# Patient Record
Sex: MALE | Race: WHITE
[De-identification: names, ages, dates, MRNs, and addresses within clinical notes are randomized per-mention and may not be internally consistent; named-entity substitution may affect disease eponyms.]

---

## 2019-11-06 ENCOUNTER — HOSPITAL ENCOUNTER (EMERGENCY)
Dept: HOSPITAL 97 - ER | Age: 64
Discharge: ANOTHER HOSPITAL | End: 2019-11-06
Payer: COMMERCIAL

## 2019-11-06 VITALS — DIASTOLIC BLOOD PRESSURE: 78 MMHG | SYSTOLIC BLOOD PRESSURE: 151 MMHG

## 2019-11-06 VITALS — TEMPERATURE: 98.1 F

## 2019-11-06 VITALS — OXYGEN SATURATION: 98 %

## 2019-11-06 DIAGNOSIS — F17.210: ICD-10-CM

## 2019-11-06 DIAGNOSIS — E11.9: ICD-10-CM

## 2019-11-06 DIAGNOSIS — I21.3: Primary | ICD-10-CM

## 2019-11-06 DIAGNOSIS — E78.00: ICD-10-CM

## 2019-11-06 DIAGNOSIS — I10: ICD-10-CM

## 2019-11-06 DIAGNOSIS — Z95.818: ICD-10-CM

## 2019-11-06 LAB
ALBUMIN SERPL BCP-MCNC: 3.7 G/DL (ref 3.4–5)
ALP SERPL-CCNC: 68 U/L (ref 45–117)
ALT SERPL W P-5'-P-CCNC: 19 U/L (ref 12–78)
AST SERPL W P-5'-P-CCNC: 17 U/L (ref 15–37)
BUN BLD-MCNC: 16 MG/DL (ref 7–18)
GLUCOSE SERPLBLD-MCNC: 175 MG/DL (ref 74–106)
HCT VFR BLD CALC: 45.6 % (ref 39.6–49)
INR BLD: 0.92
LYMPHOCYTES # SPEC AUTO: 2.1 K/UL (ref 0.7–4.9)
MAGNESIUM SERPL-MCNC: 1.9 MG/DL (ref 1.8–2.4)
NT-PROBNP SERPL-MCNC: 722 PG/ML (ref ?–125)
PMV BLD: 9.8 FL (ref 7.6–11.3)
POTASSIUM SERPL-SCNC: 3.7 MMOL/L (ref 3.5–5.1)
RBC # BLD: 4.5 M/UL (ref 4.33–5.43)
TROPONIN (EMERG DEPT USE ONLY): 0.21 NG/ML (ref 0–0.04)

## 2019-11-06 PROCEDURE — 83880 ASSAY OF NATRIURETIC PEPTIDE: CPT

## 2019-11-06 PROCEDURE — 96365 THER/PROPH/DIAG IV INF INIT: CPT

## 2019-11-06 PROCEDURE — 71045 X-RAY EXAM CHEST 1 VIEW: CPT

## 2019-11-06 PROCEDURE — 80076 HEPATIC FUNCTION PANEL: CPT

## 2019-11-06 PROCEDURE — 84484 ASSAY OF TROPONIN QUANT: CPT

## 2019-11-06 PROCEDURE — 80048 BASIC METABOLIC PNL TOTAL CA: CPT

## 2019-11-06 PROCEDURE — 83735 ASSAY OF MAGNESIUM: CPT

## 2019-11-06 PROCEDURE — 96375 TX/PRO/DX INJ NEW DRUG ADDON: CPT

## 2019-11-06 PROCEDURE — 85025 COMPLETE CBC W/AUTO DIFF WBC: CPT

## 2019-11-06 PROCEDURE — 85610 PROTHROMBIN TIME: CPT

## 2019-11-06 PROCEDURE — 99291 CRITICAL CARE FIRST HOUR: CPT

## 2019-11-06 PROCEDURE — 36415 COLL VENOUS BLD VENIPUNCTURE: CPT

## 2019-11-06 PROCEDURE — 92977: CPT

## 2019-11-06 PROCEDURE — 93005 ELECTROCARDIOGRAM TRACING: CPT

## 2019-11-06 NOTE — EDPHYS
Physician Documentation                                                                           

 [facility]                                                                 

Name: Roosevelt Antonio                                                                                

Age: 64 yrs                                                                                       

Sex: Male                                                                                         

: 1955                                                                                   

MRN: [id number]                                                                                   

Arrival Date: 2019                                                                          

Time: 03:06                                                                                       

Account#: [id number]                                                                            

Bed 14                                                                                            

Private MD:                                                                                       

ED Physician Dylan Pro                                                                         

HPI:                                                                                              

                                                                                             

03:22 This 64 yrs old  Male presents to ER via Wheelchair with complaints of Chest   rn  

      Pain.                                                                                       

03:22 The patient or guardian reports chest pain that is located primarily in the substernal  rn  

      area. Onset: 2 hour(s) ago. The pain does not radiate. Associated signs and symptoms:       

      Pertinent negatives: abdominal pain, headache, near syncope, palpitations, syncope,         

      vomiting. The chest pain is described as a heaviness, squeezing. Duration: The patient      

      or guardian reports a single episode, that is still ongoing. Modifying factors: The         

      symptoms are alleviated by nothing. the symptoms are aggravated by nothing. Severity of     

      pain: At its worst the pain was moderate in the emergency department the pain is            

      actually worse. The patient has not experienced similar symptoms in the past. The           

      patient has not recently seen a physician. Reports is from out of town, woke up from        

      sleep with chest pain, feels like someone standing on chest, no radiation, no trauma,       

      no fever/cough. Has had carotid stents in past, does not take his medication, has           

      diabetes/HTN/HLD. Reports intermittent lighter episodes of chest pain in last few weeks     

      leading up to this. Took antacid meds prior to coming and did not help. .                   

                                                                                                  

Historical:                                                                                       

- Allergies:                                                                                      

03:11 No Known Allergies;                                                                     jb4 

- Home Meds:                                                                                      

03:11 blood pressure medication [Active]; cholesterol medication [Active];                    jb4 

- PMHx:                                                                                           

03:11 border line diabetic; Hypertension; High Cholesterol;                                   jb4 

- PSHx:                                                                                           

03:11 carotid stents x2;                                                                      jb4 

                                                                                                  

- Immunization history:: Adult Immunizations up to date.                                          

- Social history:: Smoking status: Patient uses tobacco products, smokes one pack                 

  cigarettes per day. Patient uses alcohol, occasionally.                                         

- Ebola Screening: : No symptoms or risks identified at this time.                                

- Family history:: not pertinent.                                                                 

- Hospitalizations: : No recent hospitalization is reported.                                      

                                                                                                  

                                                                                                  

ROS:                                                                                              

03:24 Constitutional: Negative for fever, chills, and weight loss, Eyes: Negative for injury, rn  

      pain, redness, and discharge, Neck: Negative for injury, pain, and swelling,                

      Cardiovascular: Negative for palpitations, and edema, Respiratory: Negative for             

      shortness of breath, cough, wheezing, and pleuritic chest pain, Abdomen/GI: Negative        

      for abdominal pain, diarrhea, and constipation, MS/Extremity: Negative for injury and       

      deformity, Skin: Negative for injury, rash, and discoloration, Neuro: Negative for          

      headache, weakness, numbness, tingling, and seizure.                                        

                                                                                                  

Exam:                                                                                             

03:24 Constitutional:  This is a well developed, well nourished patient who is awake, alert,  rn  

      appears uncomfortable and anxious Head/Face:  Normocephalic, atraumatic. Eyes:  Pupils      

      equal round and reactive to light, extra-ocular motions intact.  Lids and lashes            

      normal.  Conjunctiva and sclera are non-icteric and not injected.  Cornea within normal     

      limits.  Periorbital areas with no swelling, redness, or edema. ENT:  MMM                   

      Cardiovascular:  Regular rate and rhythm.  No pulse deficits. Respiratory:  Lungs have      

      equal breath sounds bilaterally, clear to auscultation.  No increased work of               

      breathing, no retractions or nasal flaring. Abdomen/GI:  Soft, non-tender MS/               

      Extremity:  Pulses equal, no cyanosis.  Neurovascular intact.  Full, normal range of        

      motion.  Equal circumference. Neuro:  Awake and alert, GCS 15, oriented to person,          

      place, time, and situation.  Cranial nerves II-XII grossly intact.  Motor strength 5/5      

      in all extremities.  Sensory grossly intact.  Cerebellar exam normal.  Normal gait.         

04:09 ECG was reviewed by the Attending Physician.                                            rn  

                                                                                                  

Vital Signs:                                                                                      

03:11  / 93; Pulse 99; Resp 16; Temp 98.1(O); Pulse Ox 99% on R/A; Weight 72.57 kg (R); jb4 

      Height 5 ft. 7 in. (170.18 cm) (R); Pain 7/10;                                              

03:23 Weight 68.45 kg;                                                                        wh  

03:30  / 79; Pulse 98; Resp 96; Pulse Ox 98% on 2 lpm NC;                               wh  

04:00  / 78; Pulse 96; Resp 18; Pulse Ox 98% on R/A;                                    wh  

03:23 Body Mass Index 23.63 (68.45 kg, 170.18 cm)                                             wh  

                                                                                                  

MDM:                                                                                              

03:07 Patient medically screened.                                                             rn  

03:20 ED course: Pt with STEMI, medications ordered, states pain is worsening, will transfer  rn  

      via [facility] for STEMI..                                                                  

03:48 Differential diagnosis: acute myocardial infarction, coronary artery disease unstable   rn  

      angina. The patient was given aspirin in the Emergency Department. Data reviewed: vital     

      signs, nurses notes, EKG, radiologic studies, plain films, and as a result, I will          

      admit patient. Test interpretation: by ED physician or midlevel provider: ECG, plain        

      radiologic studies, CXR without acute findings. Counseling: I had a detailed discussion     

      with the patient and/or guardian regarding: the historical points, exam findings, and       

      any diagnostic results supporting the discharge/admit diagnosis, radiology results, the     

      need to transfer to another facility. Response to treatment: the patient's symptoms         

      have mildly improved after treatment, and as a result, I will admit patient. ED course:     

      Accepted for transfer to [facility] for STEMI, consented patient for TNKase given     

      delayed presentation, now near 3 hours of worsening pain and STEMI. .                       

                                                                                                  

                                                                                             

03:13 Order name: Basic Metabolic Panel; Complete Time: 04:03                                   

                                                                                             

03:13 Order name: CBC with Diff; Complete Time: 04:03                                           

                                                                                             

03:13 Order name: LFT's; Complete Time: 04:03                                                   

                                                                                             

03:13 Order name: Magnesium; Complete Time: 04:03                                               

                                                                                             

03:13 Order name: NT PRO-BNP; Complete Time: 04:03                                              

                                                                                             

03:13 Order name: PT-INR; Complete Time: 04:03                                                  

                                                                                             

03:13 Order name: Troponin (emerg Dept Use Only); Complete Time: 04:03                          

                                                                                             

03:13 Order name: XRAY Chest (1 view)                                                           

                                                                                             

03:13 Order name: EKG; Complete Time: 03:14                                                     

                                                                                             

03:13 Order name: Cardiac monitoring; Complete Time: :                                      

                                                                                             

03:13 Order name: EKG - Nurse/Tech; Complete Time: :                                        

                                                                                             

03:13 Order name: IV Saline Lock; Complete Time: :                                          

                                                                                             

03:13 Order name: Labs collected and sent; Complete Time: :                                 

                                                                                             

03:13 Order name: O2 Per Protocol; Complete Time:                                          

                                                                                             

03:13 Order name: O2 Sat Monitoring; Complete Time:                                        

                                                                                                  

EC:09 Rate is 102 beats/min. Rhythm is regular. Left axis deviation noted. QRS is positive in rn  

      lead I and negative in lead III. DE interval is normal. QRS interval is normal. QT          

      interval is normal. No Q waves. T waves are Normal. ST Segment is elevated in leads V1,     

      V2, V3. ST Segment is depressed in leads V4, V5, V6. Clinical impression: Acute MI.         

      Interpreted by me. Reviewed by me.                                                          

                                                                                                  

Administered Medications:                                                                         

03:30 Drug: Aspirin Chewable Tablet 324 mg Route: PO;                                           

04:30 Follow up: Response: No adverse reaction                                                  

03:34 Drug: Nitroglycerin 0.4 mg Route: Sublingual;                                             

04:31 Follow up: Response: No adverse reaction                                                  

03:36 Drug: PlaVIX 300 mg Route: PO;                                                            

04:31 Follow up: Response: No adverse reaction                                                  

03:37 Drug: Heparin (MI-Bolus with thrombolytic) - HEParin 60 units/kg {Co-Signature: jb4       

      (Mark Fernando RN).} Route: IVP; Site: right antecubital;                                    

04:31 Follow up: Response: No adverse reaction                                                  

03:40 Drug: Heparin (MI Drip) 12 units/kg/hr - (HEParin 41738 units, D5W 500 ml)                

      {Co-Signature: jb4 (Mark Fernando RN).} Route: IV; Rate: calculated rate; Site: right        

      antecubital;                                                                                

04:31 Follow up: Response: No adverse reaction; IV Status: Infusion continued upon transfer     

03:50 Drug: Tenecteplase 35 mg {Co-Signature: ea (Jenni Bernardo RN).} Route: IV; Rate:          

      calculated rate; Site: left antecubital;                                                    

04:31 Follow up: Response: No adverse reaction; IV Status: Completed infusion                   

04:10 Drug: morphine 4 mg {Note: Rass score 0.} Route: IVP; Site: left antecubital;           jb4 

04:15 Follow up: Response: No adverse reaction; Pain is decreased; RASS: Alert and Calm (0)   [facility] 

04:32 Follow up: Response: No adverse reaction; Pain is decreased; RASS: Alert and Calm (0)     

                                                                                                  

                                                                                                  

Disposition:                                                                                      

03:48 Critical Care:.                                                                         rn  

                                                                                                  

Disposition:                                                                                      

19 03:50 Transfer ordered to [facility]. Diagnosis is ST         

  elevation (STEMI) myocardial infarction of unspecified site.                                    

- Reason for transfer: Higher level of care.                                                      

- Accepting physician is Dr. Snyder.                                                               

- Condition is Fair.                                                                              

- Problem is new.                                                                                 

- Symptoms have improved.                                                                         

                                                                                                  

                                                                                                  

                                                                                                  

Critical care time excluding procedures:                                                          

03:48 Critical care time: Bedside Care: 25 minutes, Consultation: 5 minutes. Total time: 30   rn  

      minutes                                                                                     

                                                                                                  

Signatures:                                                                                       

Dispatcher [employer]                           EDMS                                                 

Dylan Pro MD MD rn Bryson, James, RN                       RN   jb4                                                  

Maryann Sanders RN                              jb4                                                  

Jenni Bernardo RN, ea                                                   

                                                                                                  

Corrections: (The following items were deleted from the chart)                                    

04:32 03:50 2019 03:50 Transfer ordered to [facility].         

      Diagnosis is ST elevation (STEMI) myocardial infarction of unspecified site. Reason for     

      transfer: Higher level of care. Accepting physician is Dr. Snyder. Condition is Fair.        

      Problem is new. Symptoms have improved. rn                                                  

                                                                                                  

**************************************************************************************************

## 2019-11-06 NOTE — ER
Nurse's Notes                                                                                     

 [facility]                                                                 

Name: Roosevelt Antonio                                                                                

Age: 64 yrs                                                                                       

Sex: Male                                                                                         

: 1955                                                                                   

MRN: [id number]                                                                                   

Arrival Date: 2019                                                                          

Time: 03:06                                                                                       

Account#: [id number]                                                                            

Bed 14                                                                                            

Private MD:                                                                                       

Diagnosis: ST elevation (STEMI) myocardial infarction of unspecified site                         

                                                                                                  

Presentation:                                                                                     

                                                                                             

03:09 Presenting complaint: Patient states: I have been having chest pain since 2 am it is a  jb4 

      constant pressure that radiates to my arms. Transition of care: patient was not             

      received from another setting of care. Onset of symptoms was 2019. Risk        

      Assessment: Do you want to hurt yourself or someone else? Patient reports no desire to      

      harm self or others. Initial Sepsis Screen: Does the patient meet any 2 criteria? HR >      

      90 bpm. Yes Does the patient have a suspected source of infection? No. Patient's            

      initial sepsis screen is negative. Care prior to arrival: None.                             

03:09 Method Of Arrival: Wheelchair                                                           jb4 

03:09 Acuity: ROYCE 1                                                                           jb4 

                                                                                                  

Historical:                                                                                       

- Allergies:                                                                                      

03:11 No Known Allergies;                                                                     jb4 

- Home Meds:                                                                                      

03:11 blood pressure medication [Active]; cholesterol medication [Active];                    jb4 

- PMHx:                                                                                           

03:11 border line diabetic; Hypertension; High Cholesterol;                                   jb4 

- PSHx:                                                                                           

03:11 carotid stents x2;                                                                      jb4 

                                                                                                  

- Immunization history:: Adult Immunizations up to date.                                          

- Social history:: Smoking status: Patient uses tobacco products, smokes one pack                 

  cigarettes per day. Patient uses alcohol, occasionally.                                         

- Ebola Screening: : No symptoms or risks identified at this time.                                

- Family history:: not pertinent.                                                                 

- Hospitalizations: : No recent hospitalization is reported.                                      

                                                                                                  

                                                                                                  

Screenin:30 Abuse screen: Denies threats or abuse. Denies injuries from another. Nutritional        wh  

      screening: No deficits noted. Tuberculosis screening: No symptoms or risk factors           

      identified. Fall Risk None identified.                                                      

                                                                                                  

Assessment:                                                                                       

03:15 General: Appears in no apparent distress. uncomfortable, Behavior is calm, cooperative, wh  

      appropriate for age. Pain: Complains of pain in chest Pain radiates to left arm Pain        

      currently is 7 out of 10 on a pain scale. Quality of pain is described as pressure,         

      Pain began 1 hour ago. Neuro: Level of Consciousness is awake, alert, obeys commands,       

      Oriented to person, place, time, situation, Appropriate for age. Cardiovascular:            

      Reports chest pain, Heart tones S1 S2 Rhythm is regular. Respiratory: Airway is patent      

      Respiratory effort is even, unlabored, Respiratory pattern is regular, symmetrical. GI:     

      Abdomen is flat, non-distended. : No signs and/or symptoms were reported regarding        

      the genitourinary system. EENT: No signs and/or symptoms were reported regarding the        

      EENT system. Derm: Skin is intact, is healthy with good turgor, Skin is pink, warm \T\      

      dry. normal. Musculoskeletal: Circulation, motion, and sensation intact.                    

04:00 Reassessment: Report given to Gilma Godinez RN.                                       

04:27 Reassessment: Patient appears in no apparent distress at this time. No changes from       

      previously documented assessment. Patient and/or family updated on plan of care and         

      expected duration. Pain level reassessed. Patient is alert, oriented x 3, equal             

      unlabored respirations, skin warm/dry/pink. [facility] at bedside.                         

                                                                                                  

Vital Signs:                                                                                      

03:11  / 93; Pulse 99; Resp 16; Temp 98.1(O); Pulse Ox 99% on R/A; Weight 72.57 kg (R); jb4 

      Height 5 ft. 7 in. (170.18 cm) (R); Pain 7/10;                                              

03:23 Weight 68.45 kg;                                                                        wh  

03:30  / 79; Pulse 98; Resp 96; Pulse Ox 98% on 2 lpm NC;                                 

04:00  / 78; Pulse 96; Resp 18; Pulse Ox 98% on R/A;                                    wh  

03:23 Body Mass Index 23.63 (68.45 kg, 170.18 cm)                                               

                                                                                                  

ED Course:                                                                                        

03:06 Patient arrived in ED.                                                                  ds1 

03:07 Dylan Pro MD is Attending Physician.                                                rn  

03:09 Triage completed.                                                                       jb4 

03:11 Arm band placed on left wrist. EKG completed in triage. Results shown to MD.            jb4 

03:12 Maryann Sanders is Primary Nurse.                                                           

03:30 Patient has correct armband on for positive identification. Placed in gown. Bed in low  wh  

      position. Call light in reach. Side rails up X 1. Cardiac monitor on. Pulse ox on. NIBP     

      on.                                                                                         

03:30 Inserted saline lock: 20 gauge in right antecubital area, using aseptic technique.        

      Blood collected. Oxygen administration via nasal cannula \T\ 2L/min.                          

03:37 Inserted saline lock: 18 gauge in left antecubital area, using aseptic technique.       jb4 

03:51 XRAY Chest (1 view) In Process Unspecified.                                             EDMS

04:29 No provider procedures requiring assistance completed. Patient transferred, IV remains    

      in place.                                                                                   

                                                                                                  

Administered Medications:                                                                         

03:30 Drug: Aspirin Chewable Tablet 324 mg Route: PO;                                           

04:30 Follow up: Response: No adverse reaction                                                  

03:34 Drug: Nitroglycerin 0.4 mg Route: Sublingual;                                             

04:31 Follow up: Response: No adverse reaction                                                  

03:36 Drug: PlaVIX 300 mg Route: PO;                                                            

04:31 Follow up: Response: No adverse reaction                                                  

03:37 Drug: Heparin (MI-Bolus with thrombolytic) - HEParin 60 units/kg {Co-Signature: jbJosef       

      (Mark Fernando RN).} Route: IVP; Site: right antecubital;                                    

04:31 Follow up: Response: No adverse reaction                                                  

03:40 Drug: Heparin (MI Drip) 12 units/kg/hr - (HEParin 75284 units, D5W 500 ml)                

      {Co-Signature: jb4 (Mark Fernando RN).} Route: IV; Rate: calculated rate; Site: right        

      antecubital;                                                                                

04:31 Follow up: Response: No adverse reaction; IV Status: Infusion continued upon transfer     

03:50 Drug: Tenecteplase 35 mg {Co-Signature: ea (Jenni Bernardo RN).} Route: IV; Rate:          

      calculated rate; Site: left antecubital;                                                    

04:31 Follow up: Response: No adverse reaction; IV Status: Completed infusion                   

04:10 Drug: morphine 4 mg {Note: Rass score 0.} Route: IVP; Site: left antecubital;           jb4 

04:15 Follow up: Response: No adverse reaction; Pain is decreased; RASS: Alert and Calm (0)   [facility] 

04:32 Follow up: Response: No adverse reaction; Pain is decreased; RASS: Alert and Calm (0)     

                                                                                                  

                                                                                                  

Outcome:                                                                                          

03:50 ER care complete, transfer ordered by MD.                                               rn  

04:30 Transferred by helicopter [facility], Transfer form completed. X-rays sent   

      w/ patient. Note:  Report given to [facility] crew                                         

04:30 Condition: stable                                                                           

04:30 Instructed on the need for transfer.                                                        

04:32 Patient left the ED.                                                                      

                                                                                                  

Signatures:                                                                                       

Dispatcher [employer]                           EDMS                                                 

PortilloBailee                                ds1                                                  

Dylan Pro MD MD rn Bryson, James, RN                       RN   jb4                                                  

Maryann Sanders RN                              jb4                                                  

Jenni Bernardo RN, ea                                                   

                                                                                                  

Corrections: (The following items were deleted from the chart)                                    

03:20 03:09 Acuity: ROYCE 3 jb4                                                                 jb4 

                                                                                                  

**************************************************************************************************

## 2019-11-06 NOTE — EKG
Test Date:    2019-11-06               Test Time:    03:16:01

Technician:   EZIO                                     

                                                     

MEASUREMENT RESULTS:                                       

Intervals:                                           

Rate:         99                                     

OH:           142                                    

QRSD:         114                                    

QT:           368                                    

QTc:          472                                    

Axis:                                                

P:            76                                     

OH:           142                                    

QRS:          -72                                    

T:            78                                     

                                                     

INTERPRETIVE STATEMENTS:                                       

                                                     

Normal sinus rhythm

Left axis deviation

Incomplete right bundle branch block

ST elevation, consider anterior injury or acute infarct

** ** ACUTE MI / STEMI ** **

Abnormal ECG

Compared to ECG 11/06/2019 03:05:00

Myocardial infarct finding now present

Sinus tachycardia no longer present

Possible ischemia no longer present

ST (T wave) deviation still present



Electronically Signed On 11-06-19 12:48:30 CST by Demetri Chance

## 2019-11-06 NOTE — EKG
Test Date:    2019-11-06               Test Time:    03:05:00

Technician:   EZIO                                     

                                                     

MEASUREMENT RESULTS:                                       

Intervals:                                           

Rate:         102                                    

OH:           142                                    

QRSD:         110                                    

QT:           360                                    

QTc:          469                                    

Axis:                                                

P:            73                                     

OH:           142                                    

QRS:          -70                                    

T:            77                                     

                                                     

INTERPRETIVE STATEMENTS:                                       

                                                     

Sinus tachycardia

Left axis deviation

Incomplete right bundle branch block

ST & T wave abnormality, consider anterolateral ischemia

Abnormal ECG

No previous ECG available for comparison



Electronically Signed On 11-06-19 12:48:34 CST by Demetri Chance

## 2019-11-06 NOTE — RAD REPORT
EXAM DESCRIPTION:  RAD - Chest Single View - 11/6/2019 3:51 am

 

CLINICAL HISTORY:  Chest pain

 

COMPARISON:  None.

 

TECHNIQUE:  AP portable chest image was obtained 0324 hours .

 

FINDINGS:  No focal lung parenchymal process seen. Interstitial pattern is mildly prominent suspected
 to be baseline. Slight fullness of the right side mediastinum believed to be summation of aorta and 
pulmonary vasculature. Heart and vasculature are normal. No measurable pleural effusion and no pneumo
thorax. No acute bony abnormality seen. No acute aortic findings suspected.

 

IMPRESSION:  No acute cardiopulmonary process.